# Patient Record
Sex: MALE | Race: WHITE | URBAN - METROPOLITAN AREA
[De-identification: names, ages, dates, MRNs, and addresses within clinical notes are randomized per-mention and may not be internally consistent; named-entity substitution may affect disease eponyms.]

---

## 2017-01-01 ENCOUNTER — TELEPHONE (OUTPATIENT)
Dept: NEUROSURGERY | Facility: CLINIC | Age: 82
End: 2017-01-01

## 2017-01-01 ENCOUNTER — TRANSFERRED RECORDS (OUTPATIENT)
Dept: HEALTH INFORMATION MANAGEMENT | Facility: CLINIC | Age: 82
End: 2017-01-01

## 2017-01-01 ENCOUNTER — CARE COORDINATION (OUTPATIENT)
Dept: PULMONOLOGY | Facility: CLINIC | Age: 82
End: 2017-01-01

## 2017-01-01 ENCOUNTER — OFFICE VISIT (OUTPATIENT)
Dept: PULMONOLOGY | Facility: CLINIC | Age: 82
End: 2017-01-01
Attending: INTERNAL MEDICINE
Payer: MEDICARE

## 2017-01-01 ENCOUNTER — MEDICAL CORRESPONDENCE (OUTPATIENT)
Dept: HEALTH INFORMATION MANAGEMENT | Facility: CLINIC | Age: 82
End: 2017-01-01

## 2017-01-01 ENCOUNTER — HOSPITAL ENCOUNTER (OUTPATIENT)
Dept: CARDIOLOGY | Facility: CLINIC | Age: 82
Discharge: HOME | End: 2017-05-18
Attending: INTERNAL MEDICINE | Admitting: INTERNAL MEDICINE
Payer: MEDICARE

## 2017-01-01 ENCOUNTER — OFFICE VISIT (OUTPATIENT)
Dept: NEUROSURGERY | Facility: CLINIC | Age: 82
End: 2017-01-01

## 2017-01-01 ENCOUNTER — TELEPHONE (OUTPATIENT)
Dept: PULMONOLOGY | Facility: CLINIC | Age: 82
End: 2017-01-01

## 2017-01-01 VITALS
WEIGHT: 181 LBS | SYSTOLIC BLOOD PRESSURE: 120 MMHG | HEIGHT: 67 IN | RESPIRATION RATE: 24 BRPM | DIASTOLIC BLOOD PRESSURE: 71 MMHG | HEART RATE: 79 BPM | BODY MASS INDEX: 28.41 KG/M2 | OXYGEN SATURATION: 93 %

## 2017-01-01 VITALS
HEART RATE: 59 BPM | SYSTOLIC BLOOD PRESSURE: 122 MMHG | WEIGHT: 182.1 LBS | RESPIRATION RATE: 16 BRPM | BODY MASS INDEX: 28.52 KG/M2 | OXYGEN SATURATION: 94 % | DIASTOLIC BLOOD PRESSURE: 72 MMHG

## 2017-01-01 VITALS — HEIGHT: 66 IN | HEART RATE: 76 BPM | SYSTOLIC BLOOD PRESSURE: 123 MMHG | DIASTOLIC BLOOD PRESSURE: 65 MMHG

## 2017-01-01 DIAGNOSIS — J84.9 ILD (INTERSTITIAL LUNG DISEASE) (H): Primary | ICD-10-CM

## 2017-01-01 DIAGNOSIS — I27.20 PULMONARY HYPERTENSION (H): Primary | ICD-10-CM

## 2017-01-01 DIAGNOSIS — G50.0 TRIGEMINAL NEURALGIA: Primary | ICD-10-CM

## 2017-01-01 DIAGNOSIS — I25.10 CAD (CORONARY ARTERY DISEASE): ICD-10-CM

## 2017-01-01 DIAGNOSIS — J84.9 ILD (INTERSTITIAL LUNG DISEASE) (H): ICD-10-CM

## 2017-01-01 DIAGNOSIS — R06.00 DYSPNEA: Primary | ICD-10-CM

## 2017-01-01 DIAGNOSIS — G50.0 TRIGEMINAL NEURALGIA: ICD-10-CM

## 2017-01-01 DIAGNOSIS — R01.1 HEART MURMUR: ICD-10-CM

## 2017-01-01 DIAGNOSIS — I27.20 PULMONARY HYPERTENSION (H): ICD-10-CM

## 2017-01-01 LAB
A FLAVUS AB SER QL ID: NORMAL
A FUMIGATUS1 AB SER QL ID: NORMAL
A FUMIGATUS2 AB SER QL: NORMAL
A FUMIGATUS3 AB SER QL ID: NORMAL
A FUMIGATUS6 AB SER QL ID: NORMAL
A PULLULANS AB SER QL ID: NORMAL
ALDOLASE SERPL-CCNC: 3.7
ANA SER QL IA: 1.6
ANCA IGG TITR SER IF: NORMAL {TITER}
CCP AB SER IA-ACNC: 2 U/ML
CK SERPL-CCNC: 68 U/L (ref 30–300)
CRP SERPL-MCNC: 3.1 MG/L (ref 0–8)
DLCOUNC-%PRED-PRE: 37 %
DLCOUNC-PRE: 8.11 ML/MIN/MMHG
DLCOUNC-PRED: 21.67 ML/MIN/MMHG
ENA JO1 IGG SER-ACNC: NORMAL AI (ref 0–0.9)
ENA SCL70 IGG SER IA-ACNC: 0.3 AI (ref 0–0.9)
ENA SS-A IGG SER IA-ACNC: NORMAL AI (ref 0–0.9)
ENA SS-B IGG SER IA-ACNC: NORMAL AI (ref 0–0.9)
ERV-%PRED-PRE: 77 %
ERV-PRE: 0.42 L
ERV-PRED: 0.55 L
ERYTHROCYTE [SEDIMENTATION RATE] IN BLOOD BY WESTERGREN METHOD: 10 MM/H (ref 0–20)
EXPTIME-PRE: 6.26 SEC
FEF2575-%PRED-PRE: 113 %
FEF2575-PRE: 1.99 L/SEC
FEF2575-PRED: 1.76 L/SEC
FEFMAX-%PRED-PRE: 81 %
FEFMAX-PRE: 4.91 L/SEC
FEFMAX-PRED: 6.06 L/SEC
FEV1-%PRED-PRE: 69 %
FEV1-PRE: 1.73 L
FEV1FEV6-PRE: 83 %
FEV1FEV6-PRED: 76 %
FEV1FVC-PRE: 83 %
FEV1FVC-PRED: 75 %
FEV1SVC-PRE: 81 %
FEV1SVC-PRED: 65 %
FIFMAX-PRE: 3.47 L/SEC
FRCPLETH-%PRED-PRE: 46 %
FRCPLETH-PRE: 1.68 L
FRCPLETH-PRED: 3.61 L
FVC-%PRED-PRE: 62 %
FVC-PRE: 2.08 L
FVC-PRED: 3.35 L
IC-%PRED-PRE: 51 %
IC-PRE: 1.7 L
IC-PRED: 3.3 L
IGG SERPL-MCNC: 1910 MG/DL (ref 695–1620)
IGG1 SER-MCNC: 1040 MG/DL (ref 300–856)
IGG2 SER-MCNC: 498 MG/DL (ref 158–761)
IGG3 SER-MCNC: 59 MG/DL (ref 24–192)
IGG4 SER-MCNC: 132 MG/DL (ref 11–86)
LACEYELLA SACCHARI AB SER QL: NORMAL
PIGEON DROP IGE QN: NORMAL
RHEUMATOID FACT SER NEPH-ACNC: <20 IU/ML (ref 0–20)
RVPLETH-%PRED-PRE: 44 %
RVPLETH-PRE: 1.26 L
RVPLETH-PRED: 2.81 L
S RECTIVIRGULA AB SER QL ID: NORMAL
S VIRIDIS AB SER QL: NORMAL
T CANDIDUS AB SER QL: NORMAL
T VULGARIS AB SER QL ID: NORMAL
TLCPLETH-%PRED-PRE: 52 %
TLCPLETH-PRE: 3.39 L
TLCPLETH-PRED: 6.42 L
VA-%PRED-PRE: 54 %
VA-PRE: 3.33 L
VC-%PRED-PRE: 55 %
VC-PRE: 2.13 L
VC-PRED: 3.85 L

## 2017-01-01 PROCEDURE — 86235 NUCLEAR ANTIGEN ANTIBODY: CPT | Performed by: INTERNAL MEDICINE

## 2017-01-01 PROCEDURE — 86431 RHEUMATOID FACTOR QUANT: CPT | Performed by: INTERNAL MEDICINE

## 2017-01-01 PROCEDURE — 86331 IMMUNODIFFUSION OUCHTERLONY: CPT | Performed by: INTERNAL MEDICINE

## 2017-01-01 PROCEDURE — 25500064 ZZH RX 255 OP 636: Performed by: INTERNAL MEDICINE

## 2017-01-01 PROCEDURE — 99212 OFFICE O/P EST SF 10 MIN: CPT | Mod: 25

## 2017-01-01 PROCEDURE — 86200 CCP ANTIBODY: CPT | Performed by: INTERNAL MEDICINE

## 2017-01-01 PROCEDURE — 36415 COLL VENOUS BLD VENIPUNCTURE: CPT | Performed by: INTERNAL MEDICINE

## 2017-01-01 PROCEDURE — 85652 RBC SED RATE AUTOMATED: CPT | Performed by: INTERNAL MEDICINE

## 2017-01-01 PROCEDURE — 82784 ASSAY IGA/IGD/IGG/IGM EACH: CPT | Performed by: INTERNAL MEDICINE

## 2017-01-01 PROCEDURE — 86255 FLUORESCENT ANTIBODY SCREEN: CPT | Performed by: INTERNAL MEDICINE

## 2017-01-01 PROCEDURE — 86606 ASPERGILLUS ANTIBODY: CPT | Mod: 91 | Performed by: INTERNAL MEDICINE

## 2017-01-01 PROCEDURE — 82550 ASSAY OF CK (CPK): CPT | Performed by: INTERNAL MEDICINE

## 2017-01-01 PROCEDURE — 40000264 ECHO COMPLETE BUBBLE STUDY WITH OPTISON

## 2017-01-01 PROCEDURE — 82085 ASSAY OF ALDOLASE: CPT | Performed by: INTERNAL MEDICINE

## 2017-01-01 PROCEDURE — 86140 C-REACTIVE PROTEIN: CPT | Performed by: INTERNAL MEDICINE

## 2017-01-01 PROCEDURE — 86038 ANTINUCLEAR ANTIBODIES: CPT | Performed by: INTERNAL MEDICINE

## 2017-01-01 PROCEDURE — 93306 TTE W/DOPPLER COMPLETE: CPT | Mod: 26 | Performed by: INTERNAL MEDICINE

## 2017-01-01 PROCEDURE — 82787 IGG 1 2 3 OR 4 EACH: CPT | Performed by: INTERNAL MEDICINE

## 2017-01-01 RX ORDER — CARBAMAZEPINE 200 MG/1
TABLET ORAL
Qty: 120 TABLET | Refills: 1 | OUTPATIENT
Start: 2017-01-01

## 2017-01-01 RX ORDER — CARBAMAZEPINE 200 MG/1
200 TABLET ORAL 2 TIMES DAILY
Qty: 120 TABLET | Refills: 1 | Status: SHIPPED | OUTPATIENT
Start: 2017-01-01 | End: 2017-01-01

## 2017-01-01 RX ORDER — GABAPENTIN 100 MG/1
200 CAPSULE ORAL
Qty: 240 CAPSULE | Refills: 0 | Status: SHIPPED | OUTPATIENT
Start: 2017-01-01 | End: 2017-01-01

## 2017-01-01 RX ORDER — CARBAMAZEPINE 200 MG/1
TABLET ORAL
Qty: 120 TABLET | Refills: 1 | Status: SHIPPED | OUTPATIENT
Start: 2017-01-01

## 2017-01-01 RX ORDER — ECONAZOLE NITRATE 10 MG/G
CREAM TOPICAL DAILY
COMMUNITY

## 2017-01-01 RX ORDER — BACLOFEN 10 MG/1
TABLET ORAL
Qty: 30 TABLET | Refills: 1 | Status: SHIPPED | OUTPATIENT
Start: 2017-01-01

## 2017-01-01 RX ORDER — GABAPENTIN 100 MG/1
100 CAPSULE ORAL
Qty: 240 CAPSULE | Refills: 0 | Status: SHIPPED | OUTPATIENT
Start: 2017-01-01

## 2017-01-01 RX ORDER — PIRFENIDONE 267 MG/1
CAPSULE ORAL
COMMUNITY

## 2017-01-01 RX ORDER — KETOCONAZOLE 20 MG/G
CREAM TOPICAL DAILY
COMMUNITY

## 2017-01-01 RX ORDER — CLONAZEPAM 0.5 MG/1
0.5 TABLET ORAL 2 TIMES DAILY PRN
COMMUNITY

## 2017-01-01 RX ADMIN — HUMAN ALBUMIN MICROSPHERES AND PERFLUTREN 5 ML: 10; .22 INJECTION, SOLUTION INTRAVENOUS at 11:00

## 2017-01-01 ASSESSMENT — ENCOUNTER SYMPTOMS
SINUS CONGESTION: 0
DYSPNEA ON EXERTION: 1
NECK MASS: 0
TREMORS: 1
POSTURAL DYSPNEA: 0
TASTE DISTURBANCE: 0
SMELL DISTURBANCE: 0
DECREASED CONCENTRATION: 0
SNORES LOUDLY: 0
JOINT SWELLING: 1
NECK PAIN: 1
POSTURAL DYSPNEA: 0
COUGH DISTURBING SLEEP: 0
HEMOPTYSIS: 0
DEPRESSION: 1
SPEECH CHANGE: 0
MUSCLE WEAKNESS: 1
COUGH: 0
SINUS PAIN: 0
RESPIRATORY PAIN: 0
STIFFNESS: 1
NERVOUS/ANXIOUS: 0
MUSCLE CRAMPS: 0
DIZZINESS: 1
SHORTNESS OF BREATH: 1
SPUTUM PRODUCTION: 1
LOSS OF CONSCIOUSNESS: 0
SNORES LOUDLY: 0
BACK PAIN: 1
NUMBNESS: 0
SPUTUM PRODUCTION: 1
HEADACHES: 0
MYALGIAS: 1
INSOMNIA: 0
PARALYSIS: 0
TROUBLE SWALLOWING: 0
WEAKNESS: 1
HEMOPTYSIS: 0
SHORTNESS OF BREATH: 1
SEIZURES: 0
PANIC: 0
TINGLING: 1
RESPIRATORY PAIN: 0
COUGH DISTURBING SLEEP: 0
DISTURBANCES IN COORDINATION: 0
WHEEZING: 0
WHEEZING: 1
DYSPNEA ON EXERTION: 1
ARTHRALGIAS: 1
MEMORY LOSS: 0
COUGH: 1
HOARSE VOICE: 0
SORE THROAT: 0

## 2017-01-01 ASSESSMENT — PAIN SCALES - GENERAL
PAINLEVEL: NO PAIN (0)

## 2017-02-27 NOTE — LETTER
2/27/2017       RE: Jose Delgado  [address] [facility] [address]     Dear Colleague,    Thank you for referring your patient, Jose Delgado, to the [facility] NEUROSURGERY at [facility]. Please see a copy of my visit note below.    February 27, 2017      Bigg Manning M.D.      RE:  Jose Danny      Dear Dr. Manning:      I had the pleasure of pleasure to meet Oleg Delgado and his wife and daughter today in my Neurosurgical Clinic for evaluation of right-sided facial pain.      Briefly, Oleg is an 82-year-old gentleman from [place], [place], where he has lived most of his life.  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      Over the last, I believe, 16 years, he has had some facial pain on the right side of his face.  This is electrical, shock-like, shooting, transient pain that has been well medicated over the years with increasing dosages of gabapentin.  It sounds like about 2 weeks ago, he had a very severe attack that has been persistent and despite being on 3200 mg of gabapentin, has continued to be extremely painful.        He was referred to my clinic for further evaluation and possible surgical intervention.  After reviewing his symptoms, I believe that he is dealing with classic right-sided type 1 trigeminal neuralgia.  This seems to be involving the V2 and V3 distributions predominantly.  Despite the severity of his pain, I think that this may be amenable to still medical management and would suggest that he try Tegretol.  I will actually write a prescription for him for that today and over time, this can be ramped up.  Typically for classic trigeminal neuralgia, Tegretol is very effective and so I think it is worth trying this before trying any surgical intervention.      I have also discussed different surgical interventions for  him.  He is on oxygen and has some lung disease so I do not think that a microvascular decompression with general anesthesia is perhaps the best option for him.  In light of this, I have used the [facility] website to go over in great detail the percutaneous radiofrequency rhizotomy that we perform for trigeminal neuralgia.  I explained this step-by-step for him, including the possible risks and expected numbness that would result.  I think that this would be a very good alternative for him if the Tegretol does not work.      Our plan right now is to put him on Tegretol and to ramp this up and have him come back and see me in 1 month.  If during that time he is having intolerable pain, I have given him my cell phone number and would be happy to get him in for the percutaneous radiofrequency rhizotomy.      Overall, I spent approximately 40 minutes with Oleg, with the majority of this time spent in consultation and developing a treatment plan.      Thank you for the kind referral and the opportunity to participate in Oleg's care.  If you have any further questions or concerns, please feel free to contact me on my cell phone at [phone].         COLUMBA BARILLAS MD             D: 2017 16:32   T: 2017 12:25   MT: [place]      Name:     BRIGID BAKER   MRN:      [id number]        Account:      [id number]   :      1934           Service Date: 2017      Document: [id number]

## 2017-02-27 NOTE — MR AVS SNAPSHOT
After Visit Summary   2017    Jose Delgado    MRN: [id number]           Patient Information     Date Of Birth          3/31/1934        Visit Information        Provider Department      2017 3:00 PM Misael Lindo MD [facility] Neurosurgery        Today's Diagnoses     Trigeminal neuralgia    -  1       Follow-ups after your visit        Your next 10 appointments already scheduled     Mar 27, 2017 12:40 PM CDT   (Arrive by 12:25 PM)   Return Visit with MD NINA Hartman [facility] Neurosurgery ([facility])    [address] [place] [id number] [phone]              Who to contact     Please call your clinic at [phone] to:    Ask questions about your health    Make or cancel appointments    Discuss your medicines    Learn about your test results    Speak to your doctor   If you have compliments or concerns about an experience at your clinic, or if you wish to file a complaint, please contact [facility] Patient Relations at [phone] or email us at [email or web address]         Additional Information About Your Visit        MyChart Information     Fiteeza is an electronic gateway that provides easy, online access to your medical records. With Fiteeza, you can request a clinic appointment, read your test results, renew a prescription or communicate with your care team.     To sign up for MobiClubt visit the website at [email or web address]   You will be asked to enter the access code listed below, as well as some personal information. Please follow the directions to create your username and password.     Your access code is: [email or web address]  Expires: 2017 11:41 AM     Your access code will  in 90 days. If you need help or a new code, please contact your [facility] or call [phone] for assistance.        Care EveryWhere ID      "This is your Care EveryWhere ID. This could be used by other organizations to access your [place] medical records  [id number]        Your Vitals Were     Pulse Height                76 1.676 m (5' 6\")           Blood Pressure from Last 3 Encounters:   02/27/17 123/65    Weight from Last 3 Encounters:   No data found for Wt              Today, you had the following     No orders found for display         Today's Medication Changes          These changes are accurate as of: 2/27/17 11:59 PM.  If you have any questions, ask your nurse or doctor.               Start taking these medicines.        Dose/Directions    carBAMazepine 200 MG tablet   Commonly known as:  TEGretol   Used for:  Trigeminal neuralgia   Started by:  Misael Lindo MD        Dose:  200 mg   Take 1 tablet (200 mg) by mouth 2 times daily   Quantity:  120 tablet   Refills:  1            Where to get your medicines      Some of these will need a paper prescription and others can be bought over the counter.  Ask your nurse if you have questions.     Bring a paper prescription for each of these medications     carBAMazepine 200 MG tablet                Primary Care Provider Office Phone # Fax #    Bigg Manning [phone]       [facility] [place] [address] [place] [id number]        Thank you!     Thank you for choosing [facility]  for your care. Our goal is always to provide you with excellent care. Hearing back from our patients is one way we can continue to improve our services. Please take a few minutes to complete the written survey that you may receive in the mail after your visit with us. Thank you!             Your Updated Medication List - Protect others around you: Learn how to safely use, store and throw away your medicines at www.disposemymeds.org.          This list is accurate as of: 2/27/17 11:59 PM.  Always use your most recent med list.                   Brand Name Dispense " Instructions for use    ASPIRIN PO      Take 81 mg by mouth daily       carBAMazepine 200 MG tablet    TEGretol    120 tablet    Take 1 tablet (200 mg) by mouth 2 times daily       CELEBREX PO      Take 200 mg by mouth 2 times daily       CRESTOR PO      Take 10 mg by mouth daily       econazole nitrate 1 % cream      Apply topically daily       ESBRIET 267 MG capsule   Generic drug:  pirfenidone      Take by mouth 3 times daily (with meals) 9 tabs daily       GABAPENTIN PO      Take 800 mg by mouth 4 times daily       ketoconazole 2 % cream    NIZORAL     Apply topically daily       multivitamin Tabs tablet      Take 1 tablet by mouth 2 times daily       NITROSTAT SL      Place 0.4 mg under the tongue every 5 minutes as needed for chest pain       OMEPRAZOLE PO      Take 40 mg by mouth every morning       RAMIPRIL PO      Take 5 mg by mouth daily       TAMSULOSIN HCL PO      Take 0.4 mg by mouth

## 2017-02-28 NOTE — PROGRESS NOTES
February 27, 2017      Bigg Manning M.D.      RE:  Jose Delgado      Dear Dr. Manning:      I had the pleasure of pleasure to meet Oleg Delgado and his wife and daughter today in my Neurosurgical Clinic for evaluation of right-sided facial pain.      Briefly, Oleg is an 82-year-old gentleman from [place], [place], where he has lived most of his life.  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      Over the last, I believe, 16 years, he has had some facial pain on the right side of his face.  This is electrical, shock-like, shooting, transient pain that has been well medicated over the years with increasing dosages of gabapentin.  It sounds like about 2 weeks ago, he had a very severe attack that has been persistent and despite being on 3200 mg of gabapentin, has continued to be extremely painful.        He was referred to my clinic for further evaluation and possible surgical intervention.  After reviewing his symptoms, I believe that he is dealing with classic right-sided type 1 trigeminal neuralgia.  This seems to be involving the V2 and V3 distributions predominantly.  Despite the severity of his pain, I think that this may be amenable to still medical management and would suggest that he try Tegretol.  I will actually write a prescription for him for that today and over time, this can be ramped up.  Typically for classic trigeminal neuralgia, Tegretol is very effective and so I think it is worth trying this before trying any surgical intervention.      I have also discussed different surgical interventions for him.  He is on oxygen and has some lung disease so I do not think that a microvascular decompression with general anesthesia is perhaps the best option for him.  In light of this, I have used the [facility] website to go over in great detail the percutaneous radiofrequency rhizotomy that we  perform for trigeminal neuralgia.  I explained this step-by-step for him, including the possible risks and expected numbness that would result.  I think that this would be a very good alternative for him if the Tegretol does not work.      Our plan right now is to put him on Tegretol and to ramp this up and have him come back and see me in 1 month.  If during that time he is having intolerable pain, I have given him my cell phone number and would be happy to get him in for the percutaneous radiofrequency rhizotomy.      Overall, I spent approximately 40 minutes with Oleg, with the majority of this time spent in consultation and developing a treatment plan.      Thank you for the kind referral and the opportunity to participate in Oleg's care.  If you have any further questions or concerns, please feel free to contact me on my cell phone at [phone].         COLUMBA BARILLAS MD             D: 2017 16:32   T: 2017 12:25   MT: [place]      Name:     BRIGID BAKER   MRN:      [id number]        Account:      [id number]   :      1934           Service Date: 2017      Document: [id number]

## 2017-03-06 NOTE — TELEPHONE ENCOUNTER
Called patient to check in to see how his Tegretol is helping with his facial pain. He started Tegretol 200mg BID on 2/27 and patient states that his facial pain is gone and he feels that the Tegretol has completely taken care of the pain. He denies any side effects. Discussed with patient that if he starts to have increased facial pain, he should call our office to discuss increasing the dose of his medication. He was also encouraged to call if there are any side effects that arise. Patient verbalized understanding and is scheduled to follow-up with Dr. iLndo on 3/27.     Machelle Benitez RN

## 2017-03-29 NOTE — LETTER
3/29/2017       RE: Brigid Delgado  [address] [facility] [address]     Dear Colleague,    Thank you for referring your patient, Brigid Delgado, to the [facility] NEUROSURGERY at [facility]. Please see a copy of my visit note below.    2017      Bigg Manning M.D.      RE:  Brigid Delgado      Dear Dr. Manning:      I had the pleasure to see Brigid Delgado today in my Neurosurgical Clinic for evaluation of trigeminal neuralgia.  The last time I saw him, he was having very severe pain and we had started him on Tegretol.  I am pleased to say that this has eliminated his pain at this point in time.  He is doing great.      [facility].  He will follow up with us p.r.n. if his pain was to be exacerbated.      Overall, I spent approximately 10 minutes with Brigid, with the majority of this time spent in consultation and developing a treatment plan.      Thank you for your trust and the opportunity to participate in Brigid's care.  If you have any further questions or concerns, please feel free to contact me on my cell phone at [phone].         COLUMBA BARILLAS MD             D: 2017 16:40   T: 2017 10:18   MT: [place]      Name:     BRIGID DELGADO   MRN:      [id number]        Account:      [id number]   :      1934           Service Date: 2017      Document: [id number]

## 2017-03-29 NOTE — MR AVS SNAPSHOT
After Visit Summary   3/29/2017    Jose Delgado    MRN: [id number]           Patient Information     Date Of Birth          3/31/1934        Visit Information        Provider Department      3/29/2017 3:40 PM Misael Lindo MD [facility] Neurosurgery        Today's Diagnoses     Trigeminal neuralgia    -  1       Follow-ups after your visit        Follow-up notes from your care team     Return if symptoms worsen or fail to improve.      Your next 10 appointments already scheduled     May 18, 2017  6:30 AM CDT   SIX MINUTE WALK with UC PFL A, UC PFL 6 MINUTE WALK 1   [facility] ([facility])    [address] [place] [address] [phone]            May 18, 2017  8:00 AM CDT   (Arrive by 7:45 AM)   New Interstitial Lung with Sabrina Lucas MD   [facility] ([facility])    [address] [place] [address] [phone]              Who to contact     Please call your clinic at [phone] to:    Ask questions about your health    Make or cancel appointments    Discuss your medicines    Learn about your test results    Speak to your doctor   If you have compliments or concerns about an experience at your clinic, or if you wish to file a complaint, please contact [facility] Patient Relations at [phone] or email us at [email or web address]         Additional Information About Your Visit        MyChart Information     Yeexoot is an electronic gateway that provides easy, online access to your medical records. With Funderbeam, you can request a clinic appointment, read your test results, renew a prescription or communicate with your care team.     To sign up for Yeexoot visit the website at [email or web address]   You will be asked to enter the access code listed below, as well as some  "personal information. Please follow the directions to create your username and password.     Your access code is: [email or web address]  Expires: 2017 12:41 PM     Your access code will  in 90 days. If you need help or a new code, please contact your [facility] Clinic or call [phone] for assistance.        Care EveryWhere ID     This is your Care EveryWhere ID. This could be used by other organizations to access your [place] medical records  [id number]        Your Vitals Were     Pulse Respirations Height Pulse Oximetry BMI (Body Mass Index)       79 24 1.702 m (5' 7\") 93% 28.35 kg/m2        Blood Pressure from Last 3 Encounters:   17 120/71   17 123/65    Weight from Last 3 Encounters:   17 82.1 kg (181 lb)              Today, you had the following     No orders found for display       Primary Care Provider Office Phone # Fax #    Bigg Manning [phone]       [facility] L[facility] [address] [place] [address]        Thank you!     Thank you for choosing [facility]  for your care. Our goal is always to provide you with excellent care. Hearing back from our patients is one way we can continue to improve our services. Please take a few minutes to complete the written survey that you may receive in the mail after your visit with us. Thank you!             Your Updated Medication List - Protect others around you: Learn how to safely use, store and throw away your medicines at www.disposemymeds.org.          This list is accurate as of: 3/29/17 11:59 PM.  Always use your most recent med list.                   Brand Name Dispense Instructions for use    ASPIRIN PO      Take 81 mg by mouth daily       carBAMazepine 200 MG tablet    TEGretol    120 tablet    Take 1 tablet (200 mg) by mouth 2 times daily       CELEBREX PO      Take 200 mg by mouth 2 times daily       CRESTOR PO      Take 10 mg by mouth daily       econazole " nitrate 1 % cream      Apply topically daily       ESBRIET 267 MG capsule   Generic drug:  pirfenidone      Take by mouth 3 times daily (with meals) 9 tabs daily       GABAPENTIN PO      Take 800 mg by mouth 4 times daily       ketoconazole 2 % cream    NIZORAL     Apply topically daily       multivitamin Tabs tablet      Take 1 tablet by mouth 2 times daily       NITROSTAT SL      Place 0.4 mg under the tongue every 5 minutes as needed for chest pain       OMEPRAZOLE PO      Take 40 mg by mouth every morning       RAMIPRIL PO      Take 5 mg by mouth daily       TAMSULOSIN HCL PO      Take 0.4 mg by mouth

## 2017-03-30 NOTE — PROGRESS NOTES
2017      Bigg Manning M.D.      RE:  Brigid GarciaOrlando      Dear Dr. Manning:      I had the pleasure to see Brigid Delgado today in my Neurosurgical Clinic for evaluation of trigeminal neuralgia.  The last time I saw him, he was having very severe pain and we had started him on Tegretol.  I am pleased to say that this has eliminated his pain at this point in time.  He is doing great.      [facility].  He will follow up with us p.r.n. if his pain was to be exacerbated.      Overall, I spent approximately 10 minutes with Brigid, with the majority of this time spent in consultation and developing a treatment plan.      Thank you for your trust and the opportunity to participate in Brigid's care.  If you have any further questions or concerns, please feel free to contact me on my cell phone at [phone].         COLUMBA BARILLAS MD             D: 2017 16:40   T: 2017 10:18   MT: [place]      Name:     BRIGID DELGADO   MRN:      [id number]        Account:      [id number]   :      1934           Service Date: 2017      Document: [id number]

## 2017-05-18 NOTE — PROGRESS NOTES
[facility] Interstitial Lung Disease Clinic    Reason for Visit  Jose Delgado is a 83 year old year old male who is being seen for New consult for ILD.    HPI    Mr. Delgado is an 83-year-old who is here as a new patient for evaluation of worsening interstitial lung disease.  He was referred by Dr. Sweetie Woodward at the [facility].  His history starts in approximately 2013.  At that time he had an evaluation for dyspnea which included a chest CT scan that showed some pulmonary fibrosis.  He also was evaluated by his cardiologist, Dr. Barraza at [facility], and Mr. Delgado was told that his dyspnea probably was not related to coronary artery disease.  Mr. Delgado reports that his dyspnea has worsened, especially over the past 4 months.  He and his wife went to [place] in January and returned in February.  Since February, he and his wife noted a significant increase in shortness of breath.  He also had influenza in April per his wife's report.  He was started on pirfenidone in 2015 for pulmonary fibrosis and currently takes a full dose of 3 tablets 3 times a day.  He denies side effects.  He currently does feel winded when he walks up one flight of stairs, but denies paroxysmal nocturnal dyspnea.  He has been using his home oxygen more over the past 3-4 months.  He currently uses 2 liters at nighttime and up to 5-6 liters with activity.  He has a concentrator at home and a portable oxygen concentrator when he goes out of the house.  However, the meter is capable of going up to 6 liters per minute.  He thinks that he did do pulmonary rehab 1-2 years ago in [place]; however, he felt that it was designed more for people with heart disease and did not appear to be a pulmonary program.  In addition to the shortness of breath, Mr. Delgado endorses a cough with sputum production that is sometimes light-colored and sometimes dark-colored.  He denies fevers, chest pain, skin rashes,  heartburn symptoms or lower extremity edema.  He does endorse some arthritis pain in his hands and his knees, and he had a left total knee replacement in 2010.  He also endorses palpitations with exertion.  He has had what sounds like 3 syncopal episodes this year.  His last episode was approximately 2 nights ago.  His family reports that his oxygen saturation was in the 70s, and they called 911.  He was placed on oxygen, and oxygen saturation recovered and the patient did not want to go to the hospital.  He had a walk test in [place] about 1-1/2 weeks ago per his daughter.  It apparently showed that he needed at least 4-5 liters of oxygen at rest.  His oxygen saturation at home runs anywhere from 84-89% without oxygen per his family's report.      Mr. Delgado denies prior chemotherapy or radiation therapy.  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  He denies exposure to hot tubs, mold, pet birds, or silica.  He does take omeprazole daily and denies heartburn symptoms.  Currently, he is not able to do much at home.             Current Outpatient Prescriptions   Medication     clonazePAM (KLONOPIN) 0.5 MG tablet     sertraline (ZOLOFT) 50 MG tablet     ASPIRIN PO     GABAPENTIN PO     Rosuvastatin Calcium (CRESTOR PO)     Celecoxib (CELEBREX PO)     RAMIPRIL PO     multivitamin (OCUVITE) TABS tablet     pirfenidone (ESBRIET) 267 MG capsule     Nitroglycerin (NITROSTAT SL)     ketoconazole (NIZORAL) 2 % cream     econazole nitrate 1 % cream     OMEPRAZOLE PO     TAMSULOSIN HCL PO     carBAMazepine (TEGRETOL) 200 MG tablet     No current facility-administered medications for this visit.      Allergies   Allergen Reactions     Doxycycline      Vancomycin      Past Medical History:   Diagnosis Date     CAD (coronary artery disease)     CABG 2003, LAD stent 4/2009     DJD (degenerative joint disease)      Hiatal hernia      HTN (hypertension)      ILD (interstitial lung disease) (H)       Melanoma (H)     excised left shoulder 2013     Trigeminal neuralgia        Past Surgical History:   Procedure Laterality Date     C TOTAL KNEE ARTHROPLASTY Left     2010       Social History     Social History     Marital status:      Spouse name: N/A     Number of children: N/A     Years of education: N/A     Occupational History     Not on file.     Social History Main Topics     Smoking status: Former Smoker     Types: Cigarettes     Quit date: 3/29/1967     Smokeless tobacco: Former User     Alcohol use 0.6 - 1.2 oz/week     1 - 2 Cans of beer per week      Comment: ALMOST EVERY NIGHT     Drug use: No     Sexual activity: Not on file     Other Topics Concern     Not on file     Social History Narrative    Exposed to asbestos [phone] on [employer].  Denies exposure to hot tubs, silica, pet birds, mold.  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  .       Family History   Problem Relation Age of Onset     Interstitial Lung Disease No family hx of      Connective Tissue Disorder No family hx of              ROS Pulmonary  A complete ROS was otherwise negative except as noted in the HPI.    Vitals: /72 (BP Location: Right arm, Patient Position: Chair, Cuff Size: Adult Regular)  Pulse 59  Resp 16  Wt 82.6 kg (182 lb 1.6 oz)  SpO2 94%  BMI 28.52 kg/m2    Exam:   GENERAL APPEARANCE: Well developed, well nourished, alert, and in no apparent distress.  LYMPHATICS: No significant cervical, or supraclavicular nodes.  RESP: good air flow throughout.  Bibasilar inspiratory crackles. No rhonchi. No wheezes.  CV: Normal S1, S2, regular rhythm, normal rate. + systolic murmur.  No LE edema.   ABD: BS+, soft, nontender.  MS: extremities normal. + finger clubbing. No cyanosis.  SKIN: no rash on limited exam.  NEURO: Mentation intact, speech normal, normal gait and stance.  PSYCH: mentation appears normal. and affect normal/bright.    Results:  Recent Results (from the past 168 hour(s))    General PFT Lab (Please always keep checked)    Collection Time: 05/18/17  6:25 AM   Result Value Ref Range    FVC-Pred 3.35 L    FVC-Pre 2.08 L    FVC-%Pred-Pre 62 %    FEV1-Pre 1.73 L    FEV1-%Pred-Pre 69 %    FEV1FVC-Pred 75 %    FEV1FVC-Pre 83 %    FEFMax-Pred 6.06 L/sec    FEFMax-Pre 4.91 L/sec    FEFMax-%Pred-Pre 81 %    FEF2575-Pred 1.76 L/sec    FEF2575-Pre 1.99 L/sec    LCQ9821-%Pred-Pre 113 %    ExpTime-Pre 6.26 sec    FIFMax-Pre 3.47 L/sec    VC-Pred 3.85 L    VC-Pre 2.13 L    VC-%Pred-Pre 55 %    IC-Pred 3.30 L    IC-Pre 1.70 L    IC-%Pred-Pre 51 %    ERV-Pred 0.55 L    ERV-Pre 0.42 L    ERV-%Pred-Pre 77 %    FEV1FEV6-Pred 76 %    FEV1FEV6-Pre 83 %    FRCPleth-Pred 3.61 L    FRCPleth-Pre 1.68 L    FRCPleth-%Pred-Pre 46 %    RVPleth-Pred 2.81 L    RVPleth-Pre 1.26 L    RVPleth-%Pred-Pre 44 %    TLCPleth-Pred 6.42 L    TLCPleth-Pre 3.39 L    TLCPleth-%Pred-Pre 52 %    DLCOunc-Pred 21.67 ml/min/mmHg    DLCOunc-Pre 8.11 ml/min/mmHg    DLCOunc-%Pred-Pre 37 %    VA-Pre 3.33 L    VA-%Pred-Pre 54 %    FEV1SVC-Pred 65 %    FEV1SVC-Pre 81 %   Erythrocyte sedimentation rate auto    Collection Time: 05/18/17 12:04 PM   Result Value Ref Range    Sed Rate 10 0 - 20 mm/h   CRP inflammation    Collection Time: 05/18/17 12:04 PM   Result Value Ref Range    CRP Inflammation 3.1 0.0 - 8.0 mg/L   CK total    Collection Time: 05/18/17 12:04 PM   Result Value Ref Range    CK Total 68 30 - 300 U/L      FVC FEV1 TLC DLCO   6/2/2016 [facility] 2.46 2.27  38%   11/30/2016 [facility] 2.29 70% 2.00 88%  9.04 33%   5/18/2017 [facility] 2.08 62% 1.73 69% 3.39 52% 8.11 37%     I reviewed outside chest CT scans.  There is one from 04/2017, which I compared to a chest CT from 08/2013.  The peripheral reticulation has increased compared to 2013.  I think there is possible honeycombing in the bases with some basilar predominance.  I do not see significant ground glass or mosaic attenuation.  My interpretation of the chest CT is that it  shows possible UIP pattern.  I also reviewed pulmonary function test that was performed today and compared to outside PFTs per my table above.  PFT today shows moderate restrictive lung disease with a severely reduced diffusion.  There has been a decrease in lung function over the past year.  I also reviewed outside notes from Dr. Barraza through [facility].     I reviewed results with the patient.      Assessment and plan:   Mr. Delgado is an 83-year-old who is here as a new patient referred by Dr. Sweetie Woodward for evaluation of interstitial lung disease.      I believe that he most likely has idiopathic pulmonary fibrosis based on his chest CT scan.  He was started on pirfenidone in 2015 and is tolerating the full dose.  I think this is very appropriate.  I would not recommend switching to nintedanib given his history of coronary artery disease.  I do not think steroids are indicated at this time as he is not having an acute exacerbation of his ILD.  His pulmonary fibrosis is obviously progressing, and I talked about things that could improve his quality of life.  Making sure that he has adequate oxygen supplementation at home is important.  I will have my nurse talk with him about different oxygen devices and what we can do to make sure that his insurance pays for his oxygen.  Keeping his oxygen saturations normal will I think help improve his dyspnea and also give him better quality of life and potentially allow him to do more and get out of the house.  My nurse also talked with him about palliative care, which would be helpful for him to pursue in [place] for management of symptoms and support for end of life.  We also discussed pulmonary rehab, and I will have my nurse talk with patient and family about finding a pulmonary rehab program in [place] as opposed to [place].  I will review the chest CT scan in ILD conference with Dr. Haley.  I will also check an ILD panel today to rule out underlying  connective tissue disease or hypersensitivity pneumonitis.  My suspicion for either is low.  He should continue omeprazole given some preliminary evidence to suggest that untreated acid reflux potentially could worsen pulmonary fibrosis.  I also recommended an echocardiogram today to evaluate his new murmur.  He reports that he has never been told he has a murmur before.  I would not recommend a lung biopsy given the severity of his pulmonary fibrosis as it would be too high risk.  I recommended that he try to stay as active as possible and to try hand weights at home 3 times per week.  I recommended that he avoid sick contacts.  I would be happy to see him back if it would be helpful.  We also briefly discussed IPF studies that are ongoing, and I asked him to let us know if he is interested in participating.      In conclusion, I think he most likely has IPF, but we will confirm with Dr. Haley when we review his chest CT scan in ILD conference.  The ILD panel will also rule out other causes of his pulmonary fibrosis.  He should continue pirfenidone full dose.  He is getting regular LFTs checked by Dr. Woodward, which is appropriate.     Addendum:  SERAFIN is minimally elevated at 1.6, IgG4 elevated at 132.  The other CTD and HP results are negative.  I do not think that he has underlying CTD.  I will review chest CT in ILD conference.  Addendum:  Echo shows RVSP 51 mm Hg above RAP.  LV EF also is mildly reduced.  Recommend that he see Pulmonary Hypertension clinic.  Will have my nurse contact him.

## 2017-05-18 NOTE — LETTER
5/18/2017       RE: Jose Delgado  [id number] [address] [id number]     Dear Colleague,    Thank you for referring your patient, Jose Delgado, to the [facility] at [facility]. Please see a copy of my visit note below.    [facility] Interstitial Lung Disease Clinic    Reason for Visit  Jose Delgado is a 83 year old year old male who is being seen for New consult for ILD.    HPI    Mr. Delgado is an 83-year-old who is here as a new patient for evaluation of worsening interstitial lung disease.  He was referred by Dr. Sweetie Woodward at the [facility].  His history starts in approximately 2013.  At that time he had an evaluation for dyspnea which included a chest CT scan that showed some pulmonary fibrosis.  He also was evaluated by his cardiologist, Dr. Barraza at [facility], and Mr. Delgado was told that his dyspnea probably was not related to coronary artery disease.  Mr. Delgado reports that his dyspnea has worsened, especially over the past 4 months.  He and his wife went to [place] in January and returned in February.  Since February, he and his wife noted a significant increase in shortness of breath.  He also had influenza in April per his wife's report.  He was started on pirfenidone in 2015 for pulmonary fibrosis and currently takes a full dose of 3 tablets 3 times a day.  He denies side effects.  He currently does feel winded when he walks up one flight of stairs, but denies paroxysmal nocturnal dyspnea.  He has been using his home oxygen more over the past 3-4 months.  He currently uses 2 liters at nighttime and up to 5-6 liters with activity.  He has a concentrator at home and a portable oxygen concentrator when he goes out of the house.  However, the meter is capable of going up to 6 liters per minute.  He thinks that he did do pulmonary rehab 1-2 years ago in  [place]; however, he felt that it was designed more for people with heart disease and did not appear to be a pulmonary program.  In addition to the shortness of breath, Mr. Delgado endorses a cough with sputum production that is sometimes light-colored and sometimes dark-colored.  He denies fevers, chest pain, skin rashes, heartburn symptoms or lower extremity edema.  He does endorse some arthritis pain in his hands and his knees, and he had a left total knee replacement in 2010.  He also endorses palpitations with exertion.  He has had what sounds like 3 syncopal episodes this year.  His last episode was approximately 2 nights ago.  His family reports that his oxygen saturation was in the 70s, and they called 911.  He was placed on oxygen, and oxygen saturation recovered and the patient did not want to go to the hospital.  He had a walk test in [place] about 1-1/2 weeks ago per his daughter.  It apparently showed that he needed at least 4-5 liters of oxygen at rest.  His oxygen saturation at home runs anywhere from 84-89% without oxygen per his family's report.      Mr. Delgado denies prior chemotherapy or radiation therapy.  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  He denies exposure to hot tubs, mold, pet birds, or silica.  He does take omeprazole daily and denies heartburn symptoms.  Currently, he is not able to do much at home.             Current Outpatient Prescriptions   Medication     clonazePAM (KLONOPIN) 0.5 MG tablet     sertraline (ZOLOFT) 50 MG tablet     ASPIRIN PO     GABAPENTIN PO     Rosuvastatin Calcium (CRESTOR PO)     Celecoxib (CELEBREX PO)     RAMIPRIL PO     multivitamin (OCUVITE) TABS tablet     pirfenidone (ESBRIET) 267 MG capsule     Nitroglycerin (NITROSTAT SL)     ketoconazole (NIZORAL) 2 % cream     econazole nitrate 1 % cream     OMEPRAZOLE PO     TAMSULOSIN HCL PO     carBAMazepine (TEGRETOL) 200 MG tablet     No current facility-administered  medications for this visit.      Allergies   Allergen Reactions     Doxycycline      Vancomycin      Past Medical History:   Diagnosis Date     CAD (coronary artery disease)     CABG 2003, LAD stent 4/2009     DJD (degenerative joint disease)      Hiatal hernia      HTN (hypertension)      ILD (interstitial lung disease) (H)      Melanoma (H)     excised left shoulder 2013     Trigeminal neuralgia        Past Surgical History:   Procedure Laterality Date     C TOTAL KNEE ARTHROPLASTY Left     2010       Social History     Social History     Marital status:      Spouse name: N/A     Number of children: N/A     Years of education: N/A     Occupational History     Not on file.     Social History Main Topics     Smoking status: Former Smoker     Types: Cigarettes     Quit date: 3/29/1967     Smokeless tobacco: Former User     Alcohol use 0.6 - 1.2 oz/week     1 - 2 Cans of beer per week      Comment: ALMOST EVERY NIGHT     Drug use: No     Sexual activity: Not on file     Other Topics Concern     Not on file     Social History Narrative    Exposed to asbestos [id number] on [employer].  Denies exposure to hot tubs, silica, pet birds, mold.  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  .       Family History   Problem Relation Age of Onset     Interstitial Lung Disease No family hx of      Connective Tissue Disorder No family hx of              ROS Pulmonary  A complete ROS was otherwise negative except as noted in the HPI.    Vitals: /72 (BP Location: Right arm, Patient Position: Chair, Cuff Size: Adult Regular)  Pulse 59  Resp 16  Wt 82.6 kg (182 lb 1.6 oz)  SpO2 94%  BMI 28.52 kg/m2    Exam:   GENERAL APPEARANCE: Well developed, well nourished, alert, and in no apparent distress.  LYMPHATICS: No significant cervical, or supraclavicular nodes.  RESP: good air flow throughout.  Bibasilar inspiratory crackles. No rhonchi. No wheezes.  CV: Normal S1, S2, regular rhythm, normal rate. +  systolic murmur.  No LE edema.   ABD: BS+, soft, nontender.  MS: extremities normal. + finger clubbing. No cyanosis.  SKIN: no rash on limited exam.  NEURO: Mentation intact, speech normal, normal gait and stance.  PSYCH: mentation appears normal. and affect normal/bright.    Results:  Recent Results (from the past 168 hour(s))   General PFT Lab (Please always keep checked)    Collection Time: 05/18/17  6:25 AM   Result Value Ref Range    FVC-Pred 3.35 L    FVC-Pre 2.08 L    FVC-%Pred-Pre 62 %    FEV1-Pre 1.73 L    FEV1-%Pred-Pre 69 %    FEV1FVC-Pred 75 %    FEV1FVC-Pre 83 %    FEFMax-Pred 6.06 L/sec    FEFMax-Pre 4.91 L/sec    FEFMax-%Pred-Pre 81 %    FEF2575-Pred 1.76 L/sec    FEF2575-Pre 1.99 L/sec    VPQ5479-%Pred-Pre 113 %    ExpTime-Pre 6.26 sec    FIFMax-Pre 3.47 L/sec    VC-Pred 3.85 L    VC-Pre 2.13 L    VC-%Pred-Pre 55 %    IC-Pred 3.30 L    IC-Pre 1.70 L    IC-%Pred-Pre 51 %    ERV-Pred 0.55 L    ERV-Pre 0.42 L    ERV-%Pred-Pre 77 %    FEV1FEV6-Pred 76 %    FEV1FEV6-Pre 83 %    FRCPleth-Pred 3.61 L    FRCPleth-Pre 1.68 L    FRCPleth-%Pred-Pre 46 %    RVPleth-Pred 2.81 L    RVPleth-Pre 1.26 L    RVPleth-%Pred-Pre 44 %    TLCPleth-Pred 6.42 L    TLCPleth-Pre 3.39 L    TLCPleth-%Pred-Pre 52 %    DLCOunc-Pred 21.67 ml/min/mmHg    DLCOunc-Pre 8.11 ml/min/mmHg    DLCOunc-%Pred-Pre 37 %    VA-Pre 3.33 L    VA-%Pred-Pre 54 %    FEV1SVC-Pred 65 %    FEV1SVC-Pre 81 %   Erythrocyte sedimentation rate auto    Collection Time: 05/18/17 12:04 PM   Result Value Ref Range    Sed Rate 10 0 - 20 mm/h   CRP inflammation    Collection Time: 05/18/17 12:04 PM   Result Value Ref Range    CRP Inflammation 3.1 0.0 - 8.0 mg/L   CK total    Collection Time: 05/18/17 12:04 PM   Result Value Ref Range    CK Total 68 30 - 300 U/L      FVC FEV1 TLC DLCO   6/2/2016 [facility] 2.46 2.27  38%   11/30/2016 [facility] 2.29 70% 2.00 88%  9.04 33%   5/18/2017 [facility] 2.08 62% 1.73 69% 3.39 52% 8.11 37%     I reviewed outside chest CT  scans.  There is one from 04/2017, which I compared to a chest CT from 08/2013.  The peripheral reticulation has increased compared to 2013.  I think there is possible honeycombing in the bases with some basilar predominance.  I do not see significant ground glass or mosaic attenuation.  My interpretation of the chest CT is that it shows possible UIP pattern.  I also reviewed pulmonary function test that was performed today and compared to outside PFTs per my table above.  PFT today shows moderate restrictive lung disease with a severely reduced diffusion.  There has been a decrease in lung function over the past year.  I also reviewed outside notes from Dr. Barraza through [facility].     I reviewed results with the patient.      Assessment and plan:   Mr. Delgado is an 83-year-old who is here as a new patient referred by Dr. Sweetie Woodward for evaluation of interstitial lung disease.      I believe that he most likely has idiopathic pulmonary fibrosis based on his chest CT scan.  He was started on pirfenidone in 2015 and is tolerating the full dose.  I think this is very appropriate.  I would not recommend switching to nintedanib given his history of coronary artery disease.  I do not think steroids are indicated at this time as he is not having an acute exacerbation of his ILD.  His pulmonary fibrosis is obviously progressing, and I talked about things that could improve his quality of life.  Making sure that he has adequate oxygen supplementation at home is important.  I will have my nurse talk with him about different oxygen devices and what we can do to make sure that his insurance pays for his oxygen.  Keeping his oxygen saturations normal will I think help improve his dyspnea and also give him better quality of life and potentially allow him to do more and get out of the house.  My nurse also talked with him about palliative care, which would be helpful for him to pursue in [place] for management of  symptoms and support for end of life.  We also discussed pulmonary rehab, and I will have my nurse talk with patient and family about finding a pulmonary rehab program in [place] as opposed to [place].  I will review the chest CT scan in ILD conference with Dr. Haley.  I will also check an ILD panel today to rule out underlying connective tissue disease or hypersensitivity pneumonitis.  My suspicion for either is low.  He should continue omeprazole given some preliminary evidence to suggest that untreated acid reflux potentially could worsen pulmonary fibrosis.  I also recommended an echocardiogram today to evaluate his new murmur.  He reports that he has never been told he has a murmur before.  I would not recommend a lung biopsy given the severity of his pulmonary fibrosis as it would be too high risk.  I recommended that he try to stay as active as possible and to try hand weights at home 3 times per week.  I recommended that he avoid sick contacts.  I would be happy to see him back if it would be helpful.  We also briefly discussed IPF studies that are ongoing, and I asked him to let us know if he is interested in participating.      In conclusion, I think he most likely has IPF, but we will confirm with Dr. Haley when we review his chest CT scan in ILD conference.  The ILD panel will also rule out other causes of his pulmonary fibrosis.  He should continue pirfenidone full dose.  He is getting regular LFTs checked by Dr. Woodward, which is appropriate.     Addendum:  SERAFIN is minimally elevated at 1.6, IgG4 elevated at 132.  The other CTD and HP results are negative.  I do not think that he has underlying CTD.  I will review chest CT in ILD conference.  Addendum:  Echo shows RVSP 51 mm Hg above RAP.  LV EF also is mildly reduced.  Recommend that he see Pulmonary Hypertension clinic.  Will have my nurse contact him.    Again, thank you for allowing me to participate in the care of your patient.       Sincerely,    Sabrina Lucas MD

## 2017-05-18 NOTE — NURSING NOTE
Chief Complaint   Patient presents with     Consult     Patient is being seen for ILD follow up      Angy Duran CMA at 7:44 AM on 5/18/2017

## 2017-05-18 NOTE — MR AVS SNAPSHOT
After Visit Summary   5/18/2017    Jose Delgado    MRN: [id number]           Patient Information     Date Of Birth          3/31/1934        Visit Information        Provider Department      5/18/2017 8:00 AM Sabrina Lucas MD [facility]        Today's Diagnoses     ILD (interstitial lung disease) (H)    -  1       Follow-ups after your visit        Your next 10 appointments already scheduled     May 18, 2017 10:00 AM CDT   Lab with  LAB   [facility] ([facility])    [address]  1st Floor  [facility] [id number] [phone]            May 18, 2017 10:30 AM CDT   Ech Complete Bubble with [id number]   [facility], [place],  Echocardiography ([facility])    [address] [id number] [phone]           1.  Please bring or wear a comfortable two-piece outfit. 2.  You may eat, drink and take your normal medicines. 3.  For any questions that cannot be answered, please contact the ordering physician              Future tests that were ordered for you today     Open Future Orders        Priority Expected Expires Ordered    Hypersensitivity pneumonitis Routine  8/16/2017 5/18/2017    Hypersens Pneumonitis - Farmer's Lung II Routine  8/16/2017 5/18/2017    Antineutrophil cytoplasmic Nicki IgG Routine  8/16/2017 5/18/2017    IgG Subclasses Routine  8/16/2017 5/18/2017    Cyclic Citrullinated Peptide Antibody IgG Routine  8/16/2017 5/18/2017    Antinuclear antibody screen by EIA Routine  8/16/2017 5/18/2017    Erythrocyte sedimentation rate auto Routine  8/16/2017 5/18/2017    CRP inflammation Routine  8/16/2017 5/18/2017    CK total Routine  8/16/2017 5/18/2017    Clare 1 Antibody IgG Routine  8/16/2017 5/18/2017    Scleroderma Antibody Scl70 SKYLER IgG Routine  8/16/2017 5/18/2017    Rheumatoid factor Routine  8/16/2017 5/18/2017    SSA Ro SKYLER Antibody IgG Routine  8/16/2017  5/18/2017    SSB La SKYLER Antibody IgG Routine  8/16/2017 5/18/2017    Aldolase Routine  8/16/2017 5/18/2017    Echo Complete Bubble Routine 5/18/2017 5/18/2018 5/18/2017            Who to contact     If you have questions or need follow up information about today's clinic visit or your schedule please contact [facility] directly at [phone].  Normal or non-critical lab and imaging results will be communicated to you by Nimbul[employer], letter or phone within 4 business days after the clinic has received the results. If you do not hear from us within 7 days, please contact the clinic through [facility] [employer] [facility] or phone. If you have a critical or abnormal lab result, we will notify you by phone as soon as possible.  Submit refill requests through [employer] or call your pharmacy and they will forward the refill request to us. Please allow 3 business days for your refill to be completed.          Additional Information About Your Visit        [employer] Information     [employer] gives you secure access to your electronic health record. If you see a primary care provider, you can also send messages to your care team and make appointments. If you have questions, please call your primary care clinic.  If you do not have a primary care provider, please call [phone] and they will assist you.        Care EveryWhere ID     This is your Care EveryWhere ID. This could be used by other organizations to access your [place] medical records  [id number]        Your Vitals Were     Pulse Respirations Pulse Oximetry BMI (Body Mass Index)          59 16 94% 28.52 kg/m2         Blood Pressure from Last 3 Encounters:   05/18/17 122/72   03/29/17 120/71   02/27/17 123/65    Weight from Last 3 Encounters:   05/18/17 82.6 kg (182 lb 1.6 oz)   03/29/17 82.1 kg (181 lb)               Primary Care Provider Office Phone # Fax #    Bigg Manning [phone]       [facility]  [facility] [address]        Thank you!     Thank you for choosing [facility]  for your care. Our goal is always to provide you with excellent care. Hearing back from our patients is one way we can continue to improve our services. Please take a few minutes to complete the written survey that you may receive in the mail after your visit with us. Thank you!             Your Updated Medication List - Protect others around you: Learn how to safely use, store and throw away your medicines at www.disposemymeds.org.          This list is accurate as of: 5/18/17  9:51 AM.  Always use your most recent med list.                   Brand Name Dispense Instructions for use    ASPIRIN PO      Take 81 mg by mouth daily       carBAMazepine 200 MG tablet    TEGretol    120 tablet    Take 1 tablet (200 mg) by mouth 2 times daily       CELEBREX PO      Take 200 mg by mouth 2 times daily       clonazePAM 0.5 MG tablet    klonoPIN     Take 0.5 mg by mouth 2 times daily as needed for anxiety       CRESTOR PO      Take 10 mg by mouth daily       econazole nitrate 1 % cream      Apply topically daily       ESBRIET 267 MG capsule   Generic drug:  pirfenidone      Take by mouth 3 times daily (with meals) 9 tabs daily       GABAPENTIN PO      Take 400 mg by mouth 4 times daily       ketoconazole 2 % cream    NIZORAL     Apply topically daily       multivitamin Tabs tablet      Take 1 tablet by mouth 2 times daily       NITROSTAT SL      Place 0.4 mg under the tongue every 5 minutes as needed for chest pain       OMEPRAZOLE PO      Take 40 mg by mouth every morning       RAMIPRIL PO      Take 5 mg by mouth daily       sertraline 50 MG tablet    ZOLOFT     Take by mouth daily       TAMSULOSIN HCL PO      Take 0.4 mg by mouth

## 2017-05-23 NOTE — TELEPHONE ENCOUNTER
Received call from [facility], pt is declining pulmonary rehab at the moment, will let us know if decides to do it in the future.

## 2017-05-26 NOTE — PROGRESS NOTES
Telephone Encounter: Outgoing    Reason for Outgoing Call: Requested by Dr. Lucas to contact patient with results of echocardiogram and need for referral to pulmonary hypertension clinic.    Nursing Action/Patient Instruction: Patient informed that echocardiogram shows mild LVEF and high RVSP and that Dr. Lucas is recommending referral to pulmonary hypertension clinic to see Dr. Quezada or Dr. Barbosa. Informed patient that we will work on scheduling him in PH clinic as soon as possible.    Patient Response/Questions/Concerns:  Patient and his wife agree to plan and verbalize understanding.

## 2017-05-31 NOTE — TELEPHONE ENCOUNTER
Called patient to let [facility]. Patient states that the pharmacy has been trying to get the [facility] and it came to us by mistake. Called the [facility] and they will [facility] the [facility]. Discussed with patient that if they are unable to refill, he should call our clinic back. Patient verbalized understanding and was encouraged to call with further questions or concerns.       Machelle Benitez RN

## 2017-06-02 NOTE — TELEPHONE ENCOUNTER
Patient referred to the Pulmonary Hypertension clinic (cardiology) - he declined at the moment, does have their phone number in case he decides to be seen in the future.

## 2017-07-11 NOTE — TELEPHONE ENCOUNTER
Patient called to report an increase in his facial pain over the last 7-10 days. He is currently taking Tegretol 200 mg three times a day. Discussed with Rosamaria Elmore DNP and she called and spoke to patient. She has advised patient to increase his Tegretol to 300 mg @ 0730 and noon and no change (200 mg) @ 2000. Patient will complete lab work with his PCP office and RN will follow-up with patient in 3 days to see how he is doing. Patient was advised to call sooner with any further questions or concerns.     Machelle Benitez RN

## 2017-07-14 NOTE — TELEPHONE ENCOUNTER
"Called to check in with patient to see how he is doing after adjusting his Tegretol dose to 300mg in the am and at Noon and then 200mg at 20:00. Patient states that he has not had any pain relief with this adjustment. Patient dd complete lab work with his PCP on 7/13 and results are as follows\"  WBC:8.7  Sodium:139  ALT:20  AST:18    Reviewed with Rosamaria Elmore DNP and she would like patient to increase his Tegretol to 300 mg at 7:30, Noon and 20:00. Patient is agreeable to plan and will call with further questions or concerns. RN will plan to check in with patient in 4-5 days, but patient advised to call sooner with no relief or worsening symptoms.     Machelle Benitez RN    "

## 2017-07-25 NOTE — TELEPHONE ENCOUNTER
Called patient to check in to see how he is doing after increasing his Tegretol to 300 mg three times a day. Patient states that he has not had much change in his facial pain and he is still having significant pain. Discussed with Rosamaria Elmore DNP and she would suggest the following:     She would like to add in Neurontin and the schedule should be as follows:   Tegretol and Neurontin dosage/ schedule:   Tegretol    300 mg @ 0600, 1400, and 2200   Neurontin 100 mg @ 1000 and 1800 (may increase to 200 mg BID after 3 days if pain not improved)     Rosamaria Elmore has sent the prescription for the Neurontin to patient's pharmacy. Patient and wife agreeable to this plan. Writer will follow-up with patient later in the week. They were advised to call sooner with any further questions.  Patient and wife requested referral to Dr. Merlin Nelson in [place] per the suggestion of Dr. Lindo at the last appointment. They would like someone closer to home to manage medications if possible. We will continue to manage medications until patient can schedule with Dr. Jaron Mann. Referral sent to Dr. Salmeron at [facility] neurology. Patient will call to arrange appointment.     Machelle Benitez RN

## 2017-07-25 NOTE — PROGRESS NOTES
NEUROSURGERY FOLLOW UP CALL    I spoke with the patient today after being notified that he is having break through trigeminal neuralgia (TN).    In brief, this is a 83 year old male with multiple co-morbidities, who was initially evaluated by my colleague Dr. Misael Lindo in 2/2017 for unrelenting right-sided V2 and V3 trigeminal neuralgia. He reportedly has had this condition for at least 16 years. The pain was refractory to 3200 mg of Neurontin at the time of referral. It was thought that he was not a good candidate for microvascular decompression and a trial of Tegretol was suggested. The patient subsequently responded to Tegretol well (was in complete remission) until about a month ago when started to develop breakthrough pain. We titrated his Tegretol dosage from 200 mg TID to 300 mg TID over the past couple of weeks. Today he called and complained that he is again experiencing small electrical shock-like pain in the right cheek and jaw mostly when he touches his face, brushes teeth and eats. He also reports intermittent ache in the right jaw.    Given that he is already on a relatively high dosage of Tegretol, I offered to add either Neurontin or Baclofen to see if this will help. I also suggest that he schedules an appointment with Dr. Lindo to discuss about the possibility of percutaneous radiofequency rhizotomy in case his TN remains resistant to medications or if he develops intolerable side effects. He wanted to give the new medication regimen a try.   I will have him start on Neurontin 100 mg BID for 3 days and then increase it to 200 mg BID if he does not improve.  I will have our RN coordinator go over the schedule and follow up with him in 3- 4 days.     Tegretol and Neurontin dosage/ schedule:  Tegretol    300 mg @ 0600, 1400, and 2200  Neurontin 100 mg @ 1000 and 1800 (may increase to 200 mg BID after 3 days if pain not improved)

## 2017-08-04 NOTE — TELEPHONE ENCOUNTER
Called and spoke with patient who states that he went to his PCP after having some dizziness and unsteadiness following starting Neurontin.  Patient's PCP had him discontinue the Neurontin, so he remains only on tegretol 300mg at 0600, 1400, and 2200. He states that his pain is manageable but it feels that it is slowly worsening. He cannot touch his face without having pain. Discussed with Rosamaria Elmore DNP and if the patient would like to try another medication she would prescribe Baclofen 15 mg at 1000 and 1800. Patient would like to try the Baclofen. A prescription was sent to his pharmacy and he will start the medication tomorrow. Writer will follow-up with patient next week to see how his pain is responding. Patient was advised to call sooner with new or worsening symptoms. Patient agreeable to plan and will call with further questions or concerns.     Machelle Benitez RN

## 2017-08-07 NOTE — TELEPHONE ENCOUNTER
Called and left message for patient to check in to see how his Trigeminal neuralgia pain is since starting Baclofen last week. Left direct line and encouraged patient to return call to discuss.     Machelle Benitez RN

## 2017-08-10 NOTE — TELEPHONE ENCOUNTER
Called and spoke with patient to check in to see how he is doing with his Trigeminal pain after starting the Baclofen. Patient states that he has not yet started the baclofen because his Trigeminal pain has improved significantly. He currently is only using Tegretol 300 mg at 0600, 1400, and 2200. Discussed with patient that if he begins to have a worsening in the pain again, to call our clinic to discuss starting the baclofen at that point. Patient is in agreement with plan and he was encouraged to call with further questions or concerns.     Machelle Benitez RN